# Patient Record
Sex: FEMALE | Race: WHITE | NOT HISPANIC OR LATINO | Employment: FULL TIME | URBAN - METROPOLITAN AREA
[De-identification: names, ages, dates, MRNs, and addresses within clinical notes are randomized per-mention and may not be internally consistent; named-entity substitution may affect disease eponyms.]

---

## 2023-08-22 LAB
CALCIDIOL (25 OH VITAMIN D3) (NG/ML) IN SER/PLAS: 57 NG/ML
THYROTROPIN (MIU/L) IN SER/PLAS BY DETECTION LIMIT <= 0.05 MIU/L: 1.66 MIU/L (ref 0.44–3.98)
THYROXINE (T4) FREE (NG/DL) IN SER/PLAS: 0.89 NG/DL (ref 0.61–1.12)

## 2024-05-01 DIAGNOSIS — M25.561 RIGHT KNEE PAIN, UNSPECIFIED CHRONICITY: Primary | ICD-10-CM

## 2024-05-02 ENCOUNTER — OFFICE VISIT (OUTPATIENT)
Dept: ORTHOPEDIC SURGERY | Facility: CLINIC | Age: 59
End: 2024-05-02
Payer: COMMERCIAL

## 2024-05-02 ENCOUNTER — HOSPITAL ENCOUNTER (OUTPATIENT)
Dept: RADIOLOGY | Facility: CLINIC | Age: 59
Discharge: HOME | End: 2024-05-02
Payer: COMMERCIAL

## 2024-05-02 DIAGNOSIS — M25.561 RIGHT KNEE PAIN, UNSPECIFIED CHRONICITY: ICD-10-CM

## 2024-05-02 DIAGNOSIS — M94.261 CHONDROMALACIA, KNEE, RIGHT: Primary | ICD-10-CM

## 2024-05-02 PROCEDURE — 73564 X-RAY EXAM KNEE 4 OR MORE: CPT | Mod: RIGHT SIDE | Performed by: ORTHOPAEDIC SURGERY

## 2024-05-02 PROCEDURE — 99213 OFFICE O/P EST LOW 20 MIN: CPT | Performed by: ORTHOPAEDIC SURGERY

## 2024-05-02 PROCEDURE — 73564 X-RAY EXAM KNEE 4 OR MORE: CPT | Mod: RT

## 2024-05-02 PROCEDURE — 99214 OFFICE O/P EST MOD 30 MIN: CPT | Performed by: ORTHOPAEDIC SURGERY

## 2024-05-02 RX ORDER — MELOXICAM 15 MG/1
15 TABLET ORAL DAILY
Qty: 30 TABLET | Refills: 0 | Status: SHIPPED | OUTPATIENT
Start: 2024-05-02 | End: 2024-06-01

## 2024-05-02 NOTE — PROGRESS NOTES
History of Present Illness  Chief Complaint   Patient presents with    Right Knee - Pain     NPV  Xrays today       The patient presents with side: right knee pain for  1 week .  The patient states her pain is improving.  She did have a couple episodes of instability.  There is increasing difficulty with activities of daily living and significant disability related to the knee pain.  The patient endorses the following non-operative treatments:  None .       No past medical history on file.    Medication Documentation Review Audit    **Prior to Admission medications have not yet been reviewed**         Not on File    Social History     Socioeconomic History    Marital status:      Spouse name: Not on file    Number of children: Not on file    Years of education: Not on file    Highest education level: Not on file   Occupational History    Not on file   Tobacco Use    Smoking status: Not on file    Smokeless tobacco: Not on file   Substance and Sexual Activity    Alcohol use: Not on file    Drug use: Not on file    Sexual activity: Not on file   Other Topics Concern    Not on file   Social History Narrative    Not on file     Social Determinants of Health     Financial Resource Strain: Not on file   Food Insecurity: Not on file   Transportation Needs: Not on file   Physical Activity: Not on file   Stress: Not on file   Social Connections: Not on file   Intimate Partner Violence: Not on file   Housing Stability: Not on file       Past Surgical History:   Procedure Laterality Date    CHOLECYSTECTOMY  02/26/2014    Cholecystectomy    THYROID SURGERY  02/26/2014    Thyroid Surgery         Pain is described as  sharp     Location:  Anterior medial  Pain level: 9 when it occurred  Assistive device: is not using any ambulatory assistive devices  History of surgery: None       Review of Systems   GENERAL: Negative for malaise, significant weight loss, fever  MUSCULOSKELETAL: see HPI  NEURO:  Negative     Exam  side:  right Knee:  Skin healthy and intact  No gross swelling or ecchymosis  Alignment: normal    Correctable:  Not applicable      Effusion: mild      ROM:  0-130 degrees  mild Crepitus with range of motion  Tenderness to palpation over medial and lateral joint line and with patellar compression      No laxity to valgus stress  No laxity to varus stress  Negative Lachman´s test  Negative posterior drawer test  negative Aydin´s test  Logrolling of hip negative  No pain with internal rotation of the hip  Straight leg raise negative  Neurovascular exam normal distally  2+ DP pulse and good cap refill     Radiographs  XR knee right 4+ views  Interpreted By:  Malcolm Lazo,   STUDY:  XR KNEE RIGHT 4+ VIEWS; ; 5/2/2024 10:42 am      INDICATION:  Signs/Symptoms:pain.      ACCESSION NUMBER(S):  [id number]      ORDERING CLINICIAN:  MALCOLM LAZO      FINDINGS:  Right knee films are negative for fracture, dislocation or  destructive lesion. The joint spaces are maintained. No soft tissue  abnormality is identified. There is a mild effusion seen.          Signed by: Malcolm Lazo 5/2/2024 10:47 AM  Dictation workstation:   [id number]         Assessment  Chondromalacia side: right knee      Plan  We discussed with the patient the diagnosis of degenerative joint disease of the knee.  We reviewed an evidence-based approach to chondromalacia of the knee.  We strongly encouraged low-impact aerobic activity and non-opioid analgesics.     She is improving so I would recommend an anti-inflammatory medication.  I reassured her that I do not appreciate any significant intra-articular pathology on her clinical exam    The patient elected for  [facility]    I will see them in 1 month for recheck, sooner if there is any problems.  Questions were answered.

## 2024-05-20 ENCOUNTER — OFFICE VISIT (OUTPATIENT)
Dept: ORTHOPEDIC SURGERY | Facility: CLINIC | Age: 59
End: 2024-05-20
Payer: COMMERCIAL

## 2024-05-20 DIAGNOSIS — M65.30 ACQUIRED TRIGGER FINGER: Primary | ICD-10-CM

## 2024-05-20 PROCEDURE — 99214 OFFICE O/P EST MOD 30 MIN: CPT | Performed by: ORTHOPAEDIC SURGERY

## 2024-05-20 PROCEDURE — 2500000004 HC RX 250 GENERAL PHARMACY W/ HCPCS (ALT 636 FOR OP/ED): Performed by: ORTHOPAEDIC SURGERY

## 2024-05-20 PROCEDURE — 2500000005 HC RX 250 GENERAL PHARMACY W/O HCPCS: Performed by: ORTHOPAEDIC SURGERY

## 2024-05-20 PROCEDURE — 20550 NJX 1 TENDON SHEATH/LIGAMENT: CPT | Performed by: ORTHOPAEDIC SURGERY

## 2024-05-20 RX ORDER — LIDOCAINE HYDROCHLORIDE 10 MG/ML
1 INJECTION INFILTRATION; PERINEURAL
Status: COMPLETED | OUTPATIENT
Start: 2024-05-20 | End: 2024-05-20

## 2024-05-20 RX ADMIN — LIDOCAINE HYDROCHLORIDE 1 ML: 10 INJECTION, SOLUTION INFILTRATION; PERINEURAL at 08:14

## 2024-05-20 RX ADMIN — TRIAMCINOLONE ACETONIDE 10 MG: 10 INJECTION, SUSPENSION INTRA-ARTICULAR; INTRALESIONAL at 08:14

## 2024-05-20 NOTE — PROGRESS NOTES
5/20/2024    Chief Complaint   Patient presents with    Left Thumb - Pain     LT trigger thumb       History of Present Illness:  Patient Izzy Salgado , 59 y.o. female, presents today, 5/20/2024, for evaluation of left thumb  pain and mechanical triggering, ongoing about 2 months.  She is left-hand dominant dividual.  She denies any discrete injury or trauma associated symptom onset.  Triggering steadily worsened with time.  She has history of previous right trigger thumb release done with Dr. Kyle in 2022.  She is optimistic she can start with the nonoperative treatment strategies for this however .         Review of Systems:   GENERAL: Negative  GI: Negative  MUSCULOSKELETAL: See HPI  SKIN: Negative  NEURO:  Negative     Physical Exam:  GENERAL:  Alert and oriented to person, place, and time.  No acute distress and breathing comfortably; pleasant and cooperative with the examination.  HEENT:  Head is normocephalic and atraumatic.  NECK:  Supple, no visible swelling.  CARDIOVASCULAR:  No palpable tachycardia.  LUNGS:  No audible wheezing or labored breathing.  ABDOMEN:  Nondistended.  Extremities: Evaluation of left upper extremity finds the patient to have a palpable radial artery at the wrist with brisk capillary refill to all digits. The patient has intact sensorium to axillary, radial, median and ulnar nerves. There are no open wounds. There are no signs of infection. There is no evidence of lymphedema or lymphatic streaking. The patient has supple compartments of the left arm, forearm and hand.  Tender palpation over A1 pulley of the left thumb with mechanical triggering noted digital flexion and extension.     Imaging/Test Results:  None today.     Assessment:  Left trigger thumb.  Operative and nonoperative treatment strategies were discussed.  Recommendations were made for initial nonoperative management through trigger finger injection.  This was performed by Dr. Kyle and tolerated well by  patient.  Continue weight bearing as tolerated and activities to tolerance.  Follow-up in 4 weeks for repeat clinical exam.  Patient verbalized agreement and understanding of plan for care.  All questions answered at today's visit.       Hand / UE Inj/Asp: L thumb A1 for trigger finger on 5/20/2024 8:14 AM  Indications: pain and tendon swelling  Details: 25 G needle, volar approach  Medications: 10 mg triamcinolone acetonide 10 mg/mL; 1 mL lidocaine 10 mg/mL (1 %)  Outcome: tolerated well, no immediate complications  Procedure, treatment alternatives, risks and benefits explained, specific risks discussed. Consent was given by the patient. Immediately prior to procedure a time out was called to verify the correct patient, procedure, equipment, support staff and site/side marked as required. Patient was prepped and draped in the usual sterile fashion.         In a face to face encounter, I performed a history and physical examination, discussed pertinent diagnostic studies if indicated, and discussed diagnosis and management strategies with both the patient and the mid-level provider. I reviewed the mid-level's note and agree with the documented findings and plan of care.  Patient presents today for evaluation of 2 months of triggering to the left thumb.  Exam shows focal tenderness to left thumb at A1 pulley and mechanical triggering.  Treatment options were discussed.  We talked about operative and nonoperative strategies.  Patient elects for trial of steroid injection and 4-week follow-up.  No x-rays upon return.

## 2024-06-06 ENCOUNTER — APPOINTMENT (OUTPATIENT)
Dept: ORTHOPEDIC SURGERY | Facility: CLINIC | Age: 59
End: 2024-06-06
Payer: COMMERCIAL

## 2024-07-01 ENCOUNTER — APPOINTMENT (OUTPATIENT)
Dept: ORTHOPEDIC SURGERY | Facility: CLINIC | Age: 59
End: 2024-07-01
Payer: COMMERCIAL

## 2024-07-15 ENCOUNTER — OFFICE VISIT (OUTPATIENT)
Dept: ORTHOPEDIC SURGERY | Facility: CLINIC | Age: 59
End: 2024-07-15
Payer: COMMERCIAL

## 2024-07-15 DIAGNOSIS — M65.30 ACQUIRED TRIGGER FINGER: Primary | ICD-10-CM

## 2024-07-15 PROCEDURE — 2500000004 HC RX 250 GENERAL PHARMACY W/ HCPCS (ALT 636 FOR OP/ED): Performed by: ORTHOPAEDIC SURGERY

## 2024-07-15 PROCEDURE — 99214 OFFICE O/P EST MOD 30 MIN: CPT | Performed by: ORTHOPAEDIC SURGERY

## 2024-07-15 PROCEDURE — 20550 NJX 1 TENDON SHEATH/LIGAMENT: CPT | Mod: LT | Performed by: ORTHOPAEDIC SURGERY

## 2024-07-15 PROCEDURE — 99213 OFFICE O/P EST LOW 20 MIN: CPT | Performed by: ORTHOPAEDIC SURGERY

## 2024-07-15 PROCEDURE — 2500000005 HC RX 250 GENERAL PHARMACY W/O HCPCS: Performed by: ORTHOPAEDIC SURGERY

## 2024-07-15 RX ORDER — LIDOCAINE HYDROCHLORIDE 10 MG/ML
0.5 INJECTION INFILTRATION; PERINEURAL
Status: COMPLETED | OUTPATIENT
Start: 2024-07-15 | End: 2024-07-15

## 2024-07-15 NOTE — PROGRESS NOTES
Hand / UE Inj/Asp: L thumb A1 for trigger finger on 7/15/2024 9:03 AM  Indications: pain and tendon swelling  Details: 25 G needle, volar approach  Medications: 5 mg triamcinolone acetonide 10 mg/mL; 0.5 mL lidocaine 10 mg/mL (1 %)  Outcome: tolerated well, no immediate complications  Procedure, treatment alternatives, risks and benefits explained, specific risks discussed. Consent was given by the patient. Immediately prior to procedure a time out was called to verify the correct patient, procedure, equipment, support staff and site/side marked as required. Patient was prepped and draped in the usual sterile fashion.

## 2024-07-15 NOTE — PROGRESS NOTES
"    7/15/2024    Chief Complaint   Patient presents with    Left Thumb - Pain       LT trigger thumb s/p inj 5/20         History of Present Illness:  Patient Izzy Salgado , 59 y.o. female, presents today, 7/15/2024, for evaluation of left thumb  trigger digit .  She underwent injection about 6 weeks ago to the left thumb.  Mechanical symptoms resolved, but she still has a little bit of discomfort that she describes as a \"tight\" sensation with increased force of work with flexion.  She is inquiring about repeat injection today.  She has history of previous right trigger thumb release.       Review of Systems:   GENERAL: Negative  GI: Negative  MUSCULOSKELETAL: See HPI  SKIN: Negative  NEURO:  Negative     Physical Exam:  GENERAL:  Alert and oriented to person, place, and time.  No acute distress and breathing comfortably; pleasant and cooperative with the examination.  HEENT:  Head is normocephalic and atraumatic.  NECK:  Supple, no visible swelling.  CARDIOVASCULAR:  No palpable tachycardia.  LUNGS:  No audible wheezing or labored breathing.  ABDOMEN:  Nondistended.  Extremities: Evaluation of left upper extremity finds the patient to have a palpable radial artery at the wrist with brisk capillary refill to all digits. The patient has intact sensorium to axillary, radial, median and ulnar nerves. There are no open wounds. There are no signs of infection. There is no evidence of lymphedema or lymphatic streaking. The patient has supple compartments of the left arm, forearm and hand.  Very mild tenderness over A1 pulley of the left thumb.     Imaging/Test Results:  None.     Assessment:  Left trigger thumb improved after 1 round of Kenalog injection, but incompletely resolved.     Plan:  Treatment options were reviewed including operative and nonoperative strategies.  Patient strong preference for continued nonoperative management.  Recommendations are made for repeat Kenalog injection to A1 pulley of the left " thumb.  This was performed in office today by Dr. Kyle and tolerated well by patient.  All questions answered at today's visit.  Follow-up in 4 weeks if she has any ongoing symptoms, if symptoms resolve she will likely call to cancel.    Hand / UE Inj/Asp: L thumb A1 for trigger finger on 7/15/2024 9:05 AM  Indications: pain and tendon swelling  Details: 25 G needle, volar approach  Medications: 5 mg triamcinolone acetonide 10 mg/mL; 0.5 mL lidocaine 10 mg/mL (1 %)  Outcome: tolerated well, no immediate complications  Procedure, treatment alternatives, risks and benefits explained, specific risks discussed. Consent was given by the patient. Immediately prior to procedure a time out was called to verify the correct patient, procedure, equipment, support staff and site/side marked as required. Patient was prepped and draped in the usual sterile fashion.       In a face to face encounter, I performed a history and physical examination, discussed pertinent diagnostic studies if indicated, and discussed diagnosis and management strategies with both the patient and the mid-level provider. I reviewed the mid-level's note and agree with the documented findings and plan of care.  Patient presents today for evaluation of painful triggering of the left thumb.  Previous injection helped but did not entirely resolve the condition.  Tenderness to left thumb at A1 pulley with crepitus through digital flexion and extension.  Treatment options were discussed.  We talked about operative and nonoperative strategies.  Patient elects to repeat steroid injection and follow-up in 4 weeks.  No x-rays upon return.

## 2024-08-19 ENCOUNTER — APPOINTMENT (OUTPATIENT)
Dept: ORTHOPEDIC SURGERY | Facility: CLINIC | Age: 59
End: 2024-08-19
Payer: COMMERCIAL

## 2024-10-17 ENCOUNTER — LAB (OUTPATIENT)
Dept: LAB | Facility: LAB | Age: 59
End: 2024-10-17
Payer: COMMERCIAL

## 2024-10-17 DIAGNOSIS — E89.0 POSTPROCEDURAL HYPOTHYROIDISM: Primary | ICD-10-CM

## 2024-10-17 LAB — TSH SERPL-ACNC: 1.55 MIU/L (ref 0.44–3.98)

## 2024-10-17 PROCEDURE — 36415 COLL VENOUS BLD VENIPUNCTURE: CPT

## 2024-10-17 PROCEDURE — 84443 ASSAY THYROID STIM HORMONE: CPT

## 2024-12-16 ENCOUNTER — OFFICE VISIT (OUTPATIENT)
Dept: ORTHOPEDIC SURGERY | Facility: CLINIC | Age: 59
End: 2024-12-16
Payer: COMMERCIAL

## 2024-12-16 DIAGNOSIS — M65.30 TRIGGER FINGER, ACQUIRED: Primary | ICD-10-CM

## 2024-12-16 PROCEDURE — 99214 OFFICE O/P EST MOD 30 MIN: CPT | Performed by: ORTHOPAEDIC SURGERY

## 2024-12-16 PROCEDURE — 2500000004 HC RX 250 GENERAL PHARMACY W/ HCPCS (ALT 636 FOR OP/ED): Performed by: ORTHOPAEDIC SURGERY

## 2024-12-16 PROCEDURE — 1036F TOBACCO NON-USER: CPT | Performed by: ORTHOPAEDIC SURGERY

## 2024-12-16 PROCEDURE — 20550 NJX 1 TENDON SHEATH/LIGAMENT: CPT | Mod: LT | Performed by: ORTHOPAEDIC SURGERY

## 2024-12-16 RX ORDER — LIDOCAINE HYDROCHLORIDE 10 MG/ML
0.5 INJECTION, SOLUTION INFILTRATION; PERINEURAL
Status: COMPLETED | OUTPATIENT
Start: 2024-12-16 | End: 2024-12-16

## 2024-12-16 NOTE — PROGRESS NOTES
History present illness: Patient presents today for return of painful triggering to the left thumb.  Previous steroid injections brought about complete resolution of symptoms but now the thumb is painful again.      Past medical history: The patient's past medical history, family history, social history, and review of systems were documented on the patient medical intake.  The updated data was reviewed in the electronic medical record.  History is negative except otherwise stated in history of present illness.        Physical examination:  General: Alert and oriented to person, place, and time.  No acute distress and breathing comfortably: Pleasant and cooperative with examination.  HEENT: Head is normocephalic and atraumatic.  Neck: Supple, no visible swelling.  Cardiovascular: No palpable tachycardia  Lungs: No audible wheezing or labored breathing  Abdomen: Nondistended.  Extremities: Evaluation of the left upper extremity finds the patient had palpable radial artery at the wrist with brisk capillary refill to all digits.  Patient has intact sensation to axillary radial median and ulnar nerves.  There are no open wounds.  There are no signs of infection.  There is no evidence of lymphedema or lymphatic streaking.  The patient has supple compartments to left arm forearm and hand.  Focal tenderness to left thumb at A1 pulley.  Mechanical triggering noted.      Radiology:      Assessment: Left trigger thumb      Plan: Treatment options were discussed.  Recommendations were made for surgical release.  Patient is agreeable with that strategy but wants to hold off until after the holidays.  She elects for temporizing steroid injection to left trigger thumb and to book for left trigger thumb release in early February 2025.  Plan for wide-awake approach to anesthesia.        Procedure:  Hand / UE Inj/Asp: L thumb A1 for trigger finger on 12/16/2024 9:55 AM  Indications: pain and tendon swelling  Details: 25 G needle,  volar approach  Medications: 5 mg triamcinolone acetonide 10 mg/mL; 0.5 mL lidocaine 10 mg/mL (1 %)  Outcome: tolerated well, no immediate complications  Procedure, treatment alternatives, risks and benefits explained, specific risks discussed. Consent was given by the patient. Immediately prior to procedure a time out was called to verify the correct patient, procedure, equipment, support staff and site/side marked as required. Patient was prepped and draped in the usual sterile fashion.

## 2025-01-29 ENCOUNTER — OFFICE VISIT (OUTPATIENT)
Dept: PRIMARY CARE | Facility: CLINIC | Age: 60
End: 2025-01-29
Payer: COMMERCIAL

## 2025-01-29 VITALS
HEIGHT: 64 IN | OXYGEN SATURATION: 97 % | BODY MASS INDEX: 35 KG/M2 | DIASTOLIC BLOOD PRESSURE: 100 MMHG | SYSTOLIC BLOOD PRESSURE: 170 MMHG | HEART RATE: 126 BPM | WEIGHT: 205 LBS

## 2025-01-29 DIAGNOSIS — J06.9 UPPER RESPIRATORY TRACT INFECTION, UNSPECIFIED TYPE: Primary | ICD-10-CM

## 2025-01-29 LAB
POC RAPID INFLUENZA A: NEGATIVE
POC RAPID INFLUENZA B: NEGATIVE

## 2025-01-29 PROCEDURE — 87804 INFLUENZA ASSAY W/OPTIC: CPT | Performed by: FAMILY MEDICINE

## 2025-01-29 PROCEDURE — 1036F TOBACCO NON-USER: CPT | Performed by: FAMILY MEDICINE

## 2025-01-29 PROCEDURE — 3008F BODY MASS INDEX DOCD: CPT | Performed by: FAMILY MEDICINE

## 2025-01-29 PROCEDURE — 99213 OFFICE O/P EST LOW 20 MIN: CPT | Performed by: FAMILY MEDICINE

## 2025-01-29 RX ORDER — LEVOTHYROXINE SODIUM 112 UG/1
TABLET ORAL
COMMUNITY

## 2025-01-29 RX ORDER — PROMETHAZINE HYDROCHLORIDE AND DEXTROMETHORPHAN HYDROBROMIDE 6.25; 15 MG/5ML; MG/5ML
5 SYRUP ORAL EVERY 4 HOURS PRN
Qty: 120 ML | Refills: 0 | Status: SHIPPED | OUTPATIENT
Start: 2025-01-29 | End: 2025-02-28

## 2025-01-29 RX ORDER — ASCORBIC ACID 500 MG
250 TABLET ORAL DAILY
COMMUNITY
Start: 2022-09-07

## 2025-01-29 RX ORDER — LANOLIN ALCOHOL/MO/W.PET/CERES
1000 CREAM (GRAM) TOPICAL DAILY
COMMUNITY

## 2025-01-29 RX ORDER — ACETAMINOPHEN 500 MG
2000 TABLET ORAL DAILY
COMMUNITY

## 2025-01-29 RX ORDER — PREDNISONE 10 MG/1
TABLET ORAL
Qty: 30 TABLET | Refills: 0 | Status: SHIPPED | OUTPATIENT
Start: 2025-01-29 | End: 2025-02-08

## 2025-01-29 ASSESSMENT — PATIENT HEALTH QUESTIONNAIRE - PHQ9
2. FEELING DOWN, DEPRESSED OR HOPELESS: NOT AT ALL
SUM OF ALL RESPONSES TO PHQ9 QUESTIONS 1 AND 2: 0
1. LITTLE INTEREST OR PLEASURE IN DOING THINGS: NOT AT ALL

## 2025-01-29 ASSESSMENT — ENCOUNTER SYMPTOMS
FATIGUE: 0
FEVER: 0
ACTIVITY CHANGE: 0
SHORTNESS OF BREATH: 0
HEADACHES: 0
DIZZINESS: 0

## 2025-01-29 NOTE — PROGRESS NOTES
"Subjective   Patient ID: Izzy Salgado is a 59 y.o. female who presents for Sick Visit (Sinus  poss wheezing cough since last night ).    Upper respiratory symptoms   - reports she has been having symptoms for approximately 12 hours   - reports she has been having some sinus pressure, and some sinus pain for the last 2 days but yesterday it began getting worse   - yesterday she was working in an attic and clearing bryson furniture and thinks it aggravated her sinuses   - endorses a scratchy throat and has some post-nasal drainage   - has very little sleep as a result of the mucous in her throat   - symptoms started with a tickle in her throat, but worrying about the cough made her feel much more short of breath   - denies any significant shortness of breath, but does endorse anxiety causing her symptoms to be worse   - has tried taking mucinex without significant improvement in symptoms   - no fevers/chills, no body aches, no nausea/vomiting   - does have a sick contact with her granddaughter            Review of Systems   Constitutional:  Negative for activity change, fatigue and fever.   Respiratory:  Negative for shortness of breath.    Cardiovascular:  Negative for chest pain.   Neurological:  Negative for dizziness and headaches.       Objective   BP (!) 170/100   Pulse (!) 126   Ht 1.626 m (5' 4\")   Wt 93 kg (205 lb)   SpO2 97%   BMI 35.19 kg/m²     Physical Exam  Constitutional:       Appearance: Normal appearance.   Cardiovascular:      Rate and Rhythm: Normal rate and regular rhythm.   Pulmonary:      Effort: Pulmonary effort is normal. No respiratory distress.      Breath sounds: Normal breath sounds. No stridor.   Neurological:      Mental Status: She is alert.         Assessment/Plan   Problem List Items Addressed This Visit    None  Visit Diagnoses         Codes    Upper respiratory tract infection, unspecified type    -  Primary J06.9    stable   - likely secondary to virus or allergy   - treat " with steroids given inflammation and tightness   - f/u PRN     Relevant Medications    predniSONE (Deltasone) 10 mg tablet    promethazine-DM (Phenergan-DM) 6.25-15 mg/5 mL syrup    Other Relevant Orders    POCT Influenza A/B manually resulted (Completed)

## 2025-02-07 ENCOUNTER — OFFICE VISIT (OUTPATIENT)
Dept: PRIMARY CARE | Facility: CLINIC | Age: 60
End: 2025-02-07
Payer: COMMERCIAL

## 2025-02-07 VITALS
OXYGEN SATURATION: 99 % | HEART RATE: 102 BPM | SYSTOLIC BLOOD PRESSURE: 124 MMHG | DIASTOLIC BLOOD PRESSURE: 82 MMHG | BODY MASS INDEX: 35 KG/M2 | WEIGHT: 205 LBS | HEIGHT: 64 IN

## 2025-02-07 DIAGNOSIS — H65.193 ACUTE EFFUSION OF BOTH MIDDLE EARS: Primary | ICD-10-CM

## 2025-02-07 PROCEDURE — 3008F BODY MASS INDEX DOCD: CPT | Performed by: FAMILY MEDICINE

## 2025-02-07 PROCEDURE — 99213 OFFICE O/P EST LOW 20 MIN: CPT | Performed by: FAMILY MEDICINE

## 2025-02-07 PROCEDURE — 1036F TOBACCO NON-USER: CPT | Performed by: FAMILY MEDICINE

## 2025-02-07 RX ORDER — FLUTICASONE PROPIONATE 50 MCG
1 SPRAY, SUSPENSION (ML) NASAL DAILY
Qty: 16 G | Refills: 11 | Status: SHIPPED | OUTPATIENT
Start: 2025-02-07 | End: 2026-02-07

## 2025-02-07 ASSESSMENT — ENCOUNTER SYMPTOMS
FATIGUE: 0
HEADACHES: 0
DIZZINESS: 0
SHORTNESS OF BREATH: 0
ACTIVITY CHANGE: 0
FEVER: 0

## 2025-02-07 NOTE — PROGRESS NOTES
"Subjective   Patient ID: Izzy Salgado is a 59 y.o. female who presents for Sick Visit (Left ear pain ).    Ear symptoms   - reports she has been having some balance issues and pressure in her ear   - does endorse some minor pain   - feels like she has wooshing in her ear, and some muffled hearing   - symptoms are all on L side   - has had improvement in respiratory symptoms since 1/29/25 when she was initially treated   - does endorse some cracking and popping in her ear   - has not been doing any nasal sprays or other OTC treatment          Review of Systems   Constitutional:  Negative for activity change, fatigue and fever.   Respiratory:  Negative for shortness of breath.    Cardiovascular:  Negative for chest pain.   Neurological:  Negative for dizziness and headaches.       Objective   /82   Pulse 102   Ht 1.626 m (5' 4\")   Wt 93 kg (205 lb)   SpO2 99%   BMI 35.19 kg/m²     Physical Exam  Constitutional:       Appearance: Normal appearance.   HENT:      Ears:      Comments: Bulging erythematous TM bilaterally worse on L  Cardiovascular:      Rate and Rhythm: Normal rate and regular rhythm.   Neurological:      Mental Status: She is alert.         Assessment/Plan   Problem List Items Addressed This Visit    None  Visit Diagnoses         Codes    Acute effusion of both middle ears    -  Primary H65.193    stable   - tx with topical steroid   - f/u PRN     Relevant Medications    fluticasone (Flonase) 50 mcg/actuation nasal spray               "

## 2025-03-31 ENCOUNTER — OFFICE VISIT (OUTPATIENT)
Dept: ORTHOPEDIC SURGERY | Facility: CLINIC | Age: 60
End: 2025-03-31
Payer: COMMERCIAL

## 2025-03-31 DIAGNOSIS — M65.30 TRIGGER FINGER, ACQUIRED: Primary | ICD-10-CM

## 2025-03-31 PROCEDURE — 1036F TOBACCO NON-USER: CPT | Performed by: ORTHOPAEDIC SURGERY

## 2025-03-31 PROCEDURE — 99214 OFFICE O/P EST MOD 30 MIN: CPT | Performed by: ORTHOPAEDIC SURGERY

## 2025-03-31 NOTE — PROGRESS NOTES
3/31/2025    Chief Complaint   Patient presents with    Left Thumb - Trigger Finger     S/P Inj 12/16/24       History of Present Illness:  Patient Izzy Salgado , 59 y.o. female, presents today, 3/31/2025, for evaluation of left thumb pain and mechanical triggering .  She has undergone multiple injections in the past before.  The last one was in December.  She has now had recurrence of mechanical triggering catching and locking.  Symptoms are worse in the morning.  She is here today inquiring about booking for surgery.  She is status post right trigger thumb release a couple years ago and this is done exceptionally well.  She is left-hand-dominant individual and having increasing functional difficulties associated with this.       Review of Systems:   GENERAL: Negative  GI: Negative  MUSCULOSKELETAL: See HPI  SKIN: Negative  NEURO:  Negative     Physical Exam:  GENERAL:  Alert and oriented to person, place, and time.  No acute distress and breathing comfortably; pleasant and cooperative with the examination.  HEENT:  Head is normocephalic and atraumatic.  NECK:  Supple, no visible swelling.  CARDIOVASCULAR:  No palpable tachycardia.  LUNGS:  No audible wheezing or labored breathing.  ABDOMEN:  Nondistended.  Extremities: Evaluation of left upper extremity finds the patient to have a palpable radial artery at the wrist with brisk capillary refill to all digits. The patient has intact sensorium to axillary, radial, median and ulnar nerves. There are no open wounds. There are no signs of infection. There is no evidence of lymphedema or lymphatic streaking. The patient has supple compartments of the left arm, forearm and hand.  Tender to palpation over A1 pulley of the left thumb with mechanical triggering noted flexion and extension on exam.     Imaging/Test Results:  None today.     Assessment:  Left trigger thumb recalcitrant to nonoperative treatment strategies.     Plan:  Treatment options were discussed  including both operative and non-operative treatment strategies.  Patient elects to proceed forth with left trigger thumb release under wide-awake approach.  Risks, benefits, and alternatives to surgery were discussed including, but not limited to infection risk, persistent or incomplete relief of pain, swelling, or stiffness, and post-operative pain and discomfort.  The patient verbalized agreement and understanding of the plan for care.  All questions answered at today's visit.  Plan for follow-up 10-14 days post-op.    In a face to face encounter, I performed a history and physical examination, discussed pertinent diagnostic studies if indicated, and discussed diagnosis and management strategies with both the patient and the mid-level provider. I reviewed the mid-level's note and agree with the documented findings and plan of care.  Patient presents today for evaluation of painful triggering to the left thumb.  Previous steroid injection helped but now triggering is back and bad again.  Focal tenderness to left thumb at A1 pulley with obvious mechanical triggering.  Treatment options were discussed.  Patient elects for surgical release of left trigger thumb through wide-awake approach to anesthesia.

## 2025-04-10 DIAGNOSIS — G89.18 POST-OP PAIN: Primary | ICD-10-CM

## 2025-04-10 RX ORDER — HYDROCODONE BITARTRATE AND ACETAMINOPHEN 5; 325 MG/1; MG/1
1 TABLET ORAL EVERY 8 HOURS PRN
Qty: 6 TABLET | Refills: 0 | Status: SHIPPED | OUTPATIENT
Start: 2025-04-10 | End: 2025-04-12

## 2025-04-11 PROCEDURE — 26055 INCISE FINGER TENDON SHEATH: CPT | Performed by: ORTHOPAEDIC SURGERY

## 2025-04-22 ENCOUNTER — OFFICE VISIT (OUTPATIENT)
Dept: ORTHOPEDIC SURGERY | Facility: CLINIC | Age: 60
End: 2025-04-22
Payer: COMMERCIAL

## 2025-04-22 DIAGNOSIS — M65.30 ACQUIRED TRIGGER FINGER: Primary | ICD-10-CM

## 2025-04-22 PROCEDURE — 99211 OFF/OP EST MAY X REQ PHY/QHP: CPT | Performed by: PHYSICIAN ASSISTANT

## 2025-04-22 PROCEDURE — 99212 OFFICE O/P EST SF 10 MIN: CPT | Performed by: PHYSICIAN ASSISTANT

## 2025-04-22 PROCEDURE — 99024 POSTOP FOLLOW-UP VISIT: CPT | Performed by: PHYSICIAN ASSISTANT

## 2025-04-22 NOTE — PROGRESS NOTES
4/22/2025    Chief Complaint   Patient presents with    Left Thumb - Post-op Visit     Trigger finger release 4/11/25       History of Present Illness:  Patient Izzy Salgado , 59 y.o. female, presents today, 4/22/2025, for evaluation of left thumb  trigger digit release, 2 weeks postop.  She had taken her own sutures out, 2 of them unraveled before the weekend and then the remaining stitch was bothering her so she removed it on Easter.  No mechanical symptoms since surgery.  No fevers, chills, constitutional symptoms reported .         Review of Systems:   GENERAL: Negative  GI: Negative  MUSCULOSKELETAL: See HPI  SKIN: Negative  NEURO:  Negative     Physical Exam:  GENERAL:  Alert and oriented to person, place, and time.  No acute distress and breathing comfortably; pleasant and cooperative with the examination.  HEENT:  Head is normocephalic and atraumatic.  NECK:  Supple, no visible swelling.  CARDIOVASCULAR:  No palpable tachycardia.  LUNGS:  No audible wheezing or labored breathing.  ABDOMEN:  Nondistended.  Extremities: The surgical incision is clean, dry, intact, and appears to be healing well.  No active bleeding, erythema, warmth, drainage, or signs of infection.  Appropriate functional ROM demonstrated with flexion/extension of the digits, and flexion/extension/pronosupination of the wrist.     Imaging/Test Results:  None today.     Assessment:  Left trigger thumb release, 2 weeks postop doing well.     Plan:  The patient can begin to weight bear as tolerated.  We discussed and reviewed home exercise program for range of motion recovery, scar massage, and desensitization techniques.  They can return to activities as tolerated.  The patient will follow-up with our office on an as needed basis.  All patient questions answered at today's visit.      Nina Jones PA-C

## 2025-06-12 ENCOUNTER — OFFICE VISIT (OUTPATIENT)
Dept: ORTHOPEDIC SURGERY | Facility: CLINIC | Age: 60
End: 2025-06-12
Payer: COMMERCIAL

## 2025-06-12 DIAGNOSIS — M65.30 ACQUIRED TRIGGER FINGER: Primary | ICD-10-CM

## 2025-06-12 PROCEDURE — 20550 NJX 1 TENDON SHEATH/LIGAMENT: CPT | Mod: RT | Performed by: ORTHOPAEDIC SURGERY

## 2025-06-12 PROCEDURE — 2500000004 HC RX 250 GENERAL PHARMACY W/ HCPCS (ALT 636 FOR OP/ED): Performed by: ORTHOPAEDIC SURGERY

## 2025-06-12 PROCEDURE — 99212 OFFICE O/P EST SF 10 MIN: CPT | Mod: 25 | Performed by: ORTHOPAEDIC SURGERY

## 2025-06-12 RX ORDER — LIDOCAINE HYDROCHLORIDE 10 MG/ML
0.5 INJECTION, SOLUTION INFILTRATION; PERINEURAL
Status: COMPLETED | OUTPATIENT
Start: 2025-06-12 | End: 2025-06-12

## 2025-06-12 RX ADMIN — TRIAMCINOLONE ACETONIDE 5 MG: 10 INJECTION, SUSPENSION INTRA-ARTICULAR; INTRALESIONAL at 09:47

## 2025-06-12 RX ADMIN — LIDOCAINE HYDROCHLORIDE 0.5 ML: 10 INJECTION, SOLUTION INFILTRATION; PERINEURAL at 09:47

## 2025-06-12 NOTE — PROGRESS NOTES
6/12/2025    Chief Complaint   Patient presents with    Right Ring Finger - Trigger Finger       History of Present Illness:  Patient Izzy Salgado , 60 y.o. female, presents today, 6/12/2025, for evaluation of right ring finger pain and mechanical triggering.  Patient is left-handed but largely ambidextrous.  Symptoms of mechanical pain and triggering the right ring finger began about 2 months or so ago.  They are worse at nighttime and occasionally wake her from sleep.  She states that in the morning she has some increased stiffness and mechanical triggering that somewhat improves throughout the day.  No injury or trauma associate with symptom onset.  She is status post thyroidectomy in 2018.       Review of Systems:   GENERAL: Negative  GI: Negative  MUSCULOSKELETAL: See HPI  SKIN: Negative  NEURO:  Negative     Physical Exam:  GENERAL:  Alert and oriented to person, place, and time.  No acute distress and breathing comfortably; pleasant and cooperative with the examination.  HEENT:  Head is normocephalic and atraumatic.  NECK:  Supple, no visible swelling.  CARDIOVASCULAR:  No palpable tachycardia.  LUNGS:  No audible wheezing or labored breathing.  ABDOMEN:  Nondistended.  Extremities: Evaluation of the right upper extremity finds the patient to have a palpable radial artery at the wrist with brisk capillary refill to all digits. The patient has intact sensorium to axillary, radial, median and ulnar nerves. There are no open wounds. There are no signs of infection. There is no evidence of lymphedema or lymphatic streaking. The patient has supple compartments of the right arm, forearm and hand.  Mild tenderness over A1 pulley of the right ring finger mechanical triggering is noted with flexion and extension.     Imaging/Test Results:  None today.     Assessment:  Right ring finger trigger digit.     Plan:  Operative and nonoperative treatment strategies were discussed.  Recommendations were made for initial  nonoperative management through trigger finger injection.  This was performed by Dr. Kyle and tolerated well by patient.  Continue weight bearing as tolerated and activities to tolerance.  Follow-up in 4 weeks for repeat clinical exam.  Patient verbalized agreement and understanding of plan for care.  All questions answered at today's visit.        Hand / UE Inj/Asp: R ring A1 for trigger finger on 6/12/2025 9:47 AM  Indications: pain and tendon swelling  Details: 25 G needle, volar approach  Medications: 5 mg triamcinolone acetonide 10 mg/mL; 0.5 mL lidocaine 10 mg/mL (1 %)  Outcome: tolerated well, no immediate complications  Procedure, treatment alternatives, risks and benefits explained, specific risks discussed. Consent was given by the patient. Immediately prior to procedure a time out was called to verify the correct patient, procedure, equipment, support staff and site/side marked as required. Patient was prepped and draped in the usual sterile fashion.       In a face to face encounter, I performed a history and physical examination, discussed pertinent diagnostic studies if indicated, and discussed diagnosis and management strategies with both the patient and the mid-level provider. I reviewed the mid-level's note and agree with the documented findings and plan of care.  Patient presents today for evaluation of 2-month history of painful triggering to right ring finger.  No injury at onset.  Focal tenderness to right ring finger A1 pulley with obvious mechanical triggering.  Treatment options were discussed including operative and nonoperative strategies.  Patient elects for trial of right ring finger steroid injection and for a 4-week follow-up.  No x-rays upon return.

## 2025-06-25 DIAGNOSIS — Z12.31 ENCOUNTER FOR SCREENING MAMMOGRAM FOR BREAST CANCER: ICD-10-CM

## 2025-07-07 ENCOUNTER — OFFICE VISIT (OUTPATIENT)
Dept: ORTHOPEDIC SURGERY | Facility: CLINIC | Age: 60
End: 2025-07-07
Payer: COMMERCIAL

## 2025-07-07 DIAGNOSIS — M65.30 TRIGGER FINGER, ACQUIRED: Primary | ICD-10-CM

## 2025-07-07 PROCEDURE — 99212 OFFICE O/P EST SF 10 MIN: CPT | Mod: 25 | Performed by: ORTHOPAEDIC SURGERY

## 2025-07-07 PROCEDURE — 20550 NJX 1 TENDON SHEATH/LIGAMENT: CPT | Mod: RT | Performed by: ORTHOPAEDIC SURGERY

## 2025-07-07 PROCEDURE — 99214 OFFICE O/P EST MOD 30 MIN: CPT | Performed by: ORTHOPAEDIC SURGERY

## 2025-07-07 PROCEDURE — 2500000004 HC RX 250 GENERAL PHARMACY W/ HCPCS (ALT 636 FOR OP/ED): Performed by: ORTHOPAEDIC SURGERY

## 2025-07-07 PROCEDURE — 1036F TOBACCO NON-USER: CPT | Performed by: ORTHOPAEDIC SURGERY

## 2025-07-07 RX ORDER — LIDOCAINE HYDROCHLORIDE 10 MG/ML
0.5 INJECTION, SOLUTION INFILTRATION; PERINEURAL
Status: COMPLETED | OUTPATIENT
Start: 2025-07-07 | End: 2025-07-07

## 2025-07-07 RX ADMIN — LIDOCAINE HYDROCHLORIDE 0.5 ML: 10 INJECTION, SOLUTION INFILTRATION; PERINEURAL at 11:09

## 2025-07-07 RX ADMIN — TRIAMCINOLONE ACETONIDE 5 MG: 10 INJECTION, SUSPENSION INTRA-ARTICULAR; INTRALESIONAL at 11:09

## 2025-07-07 NOTE — PROGRESS NOTES
History present illness: Patient presents today for evaluation of right ring finger triggering.  No pain.  Ongoing triggering however.      Past medical history: The patient's past medical history, family history, social history, and review of systems were documented on the patient medical intake.  The updated data was reviewed in the electronic medical record.  History is negative except otherwise stated in history of present illness.        Physical examination:  General: Alert and oriented to person, place, and time.  No acute distress and breathing comfortably: Pleasant and cooperative with examination.  HEENT: Head is normocephalic and atraumatic.  Neck: Supple, no visible swelling.  Cardiovascular: No palpable tachycardia  Lungs: No audible wheezing or labored breathing  Abdomen: Nondistended.  Extremities: Evaluation of the right upper extremity finds the patient had palpable radial artery at the wrist with brisk capillary refill to all digits.  Patient has intact sensation to axillary radial median and ulnar nerves.  There are no open wounds.  There are no signs of infection.  There is no evidence of lymphedema or lymphatic streaking.  The patient has supple compartments to right arm forearm and hand.  Mechanical triggering noted right ring finger A1 pulley without tenderness.      Radiology:      Assessment: Right ring finger trigger finger improved status post recent steroid injection      Plan: Treatment options were discussed.  We talked about operative and nonoperative strategies.  We talked about intraoperative techniques and postoperative protocol.  Patient declines surgical options in favor of repeat right ring finger trigger finger injection.  4-week follow-up.  No x-rays upon return.        Procedure:  Hand / UE Inj/Asp: R ring A1 for trigger finger on 7/7/2025 11:09 AM  Indications: pain and tendon swelling  Details: 25 G needle, volar approach  Medications: 5 mg triamcinolone acetonide 10 mg/mL;  0.5 mL lidocaine 10 mg/mL (1 %)  Outcome: tolerated well, no immediate complications  Procedure, treatment alternatives, risks and benefits explained, specific risks discussed. Consent was given by the patient. Immediately prior to procedure a time out was called to verify the correct patient, procedure, equipment, support staff and site/side marked as required. Patient was prepped and draped in the usual sterile fashion.

## 2025-08-05 ENCOUNTER — APPOINTMENT (OUTPATIENT)
Dept: ORTHOPEDIC SURGERY | Facility: CLINIC | Age: 60
End: 2025-08-05
Payer: COMMERCIAL